# Patient Record
Sex: FEMALE | Race: WHITE | NOT HISPANIC OR LATINO | URBAN - METROPOLITAN AREA
[De-identification: names, ages, dates, MRNs, and addresses within clinical notes are randomized per-mention and may not be internally consistent; named-entity substitution may affect disease eponyms.]

---

## 2021-01-25 ENCOUNTER — GYNECOLOGY VISIT (OUTPATIENT)
Dept: OBGYN | Facility: CLINIC | Age: 36
End: 2021-01-25
Payer: COMMERCIAL

## 2021-01-25 VITALS — DIASTOLIC BLOOD PRESSURE: 76 MMHG | SYSTOLIC BLOOD PRESSURE: 111 MMHG | WEIGHT: 141 LBS

## 2021-01-25 DIAGNOSIS — Z30.433 ENCOUNTER FOR IUD REMOVAL AND REINSERTION: ICD-10-CM

## 2021-01-25 DIAGNOSIS — Z97.5 IUD (INTRAUTERINE DEVICE) IN PLACE: ICD-10-CM

## 2021-01-25 DIAGNOSIS — Z30.09 ENCOUNTER FOR COUNSELING REGARDING CONTRACEPTION: ICD-10-CM

## 2021-01-25 PROCEDURE — 58300 INSERT INTRAUTERINE DEVICE: CPT | Performed by: OBSTETRICS & GYNECOLOGY

## 2021-01-25 PROCEDURE — 58301 REMOVE INTRAUTERINE DEVICE: CPT | Performed by: OBSTETRICS & GYNECOLOGY

## 2021-01-25 NOTE — PROCEDURES
S: Patient presents for IUD removal. Mirena IUD has been in place for 5.5 years    O: VSS, afebrile  Gen - NAD, comfortable  PELVIC EXAM -   Normal external female genitalia  BUS within normal limits, no lesions  Spec: cervix has no lesions, normal physiologic white discharge, IUD strings visualized from cervix  Vaginal wall: pink and moist, normal rugae, no lesions    Procedure - IUD removal:  Pt counseled on IUD removal. Risk for cramping, bleeding discussed.  Consent form signed.  Speculum placed in the vagina and cervix visualized. IUD strings not  Seen. Serpintine cervix was prepped with Betadine.  IUD grasper was introduced into the endocervical canal and IUD strings were found.  IUD strings grasped with ring forceps and removed intact. Hemostasis noted. Pt tolerated procedure well.    A: IUD removal    P:  Plan is for replacement with new Mirena IUD today

## 2021-01-25 NOTE — PROGRESS NOTES
Subjective:      Alena Narayan is a 35 y.o. female who presents as New Patient            HPI patient is a 35-year-old  0 who presents today as a new patient to establish care and to discuss contraception.  She recently moved here from [facility].  She has no gynecologic complaints or concerns.  Last Pap smear was  and was normal per patient.  She reports no history of PID or STDs.  Reports no prior abnormal Pap smears.    Patient states she has a Mirena IUD in place for the past 5-1/2 years and she tried several times before moving to this area to have the IUD removed but could not get an appointment.  She has no complaints with IUD.  She has mostly amenorrhea since IUD placement with some occasional spotting.  Denies any pelvic pain abnormal discharge or dyspareunia.  Reports normal bowel and bladder functions.    ROS all organ systems are reviewed and are currently negative       Objective:     /76 (BP Location: Right arm, Patient Position: Sitting, BP Cuff Size: Adult)   Wt 64 kg (141 lb)      Physical Exam  Vitals signs and nursing note reviewed. Exam conducted with a chaperone present.   Constitutional:       General: She is not in acute distress.     Appearance: Normal appearance. She is normal weight. She is not toxic-appearing.   HENT:      Head: Normocephalic and atraumatic.      Nose: Nose normal.   Neck:      Musculoskeletal: Normal range of motion and neck supple. No neck rigidity.   Cardiovascular:      Rate and Rhythm: Normal rate and regular rhythm.      Pulses: Normal pulses.      Heart sounds: Normal heart sounds. No murmur. No gallop.    Pulmonary:      Effort: Pulmonary effort is normal. No respiratory distress.      Breath sounds: Normal breath sounds. No wheezing.   Abdominal:      General: Abdomen is flat. Bowel sounds are normal. There is no distension.      Palpations: Abdomen is soft.      Tenderness: There is no abdominal tenderness.   Genitourinary:     General:  Normal vulva.      Vagina: No vaginal discharge.      Rectum: Normal.   Musculoskeletal: Normal range of motion.      Right lower leg: No edema.      Left lower leg: No edema.   Lymphadenopathy:      Cervical: No cervical adenopathy.   Skin:     General: Skin is warm and dry.      Coloration: Skin is not jaundiced.      Findings: No bruising or lesion.   Neurological:      General: No focal deficit present.      Mental Status: She is alert and oriented to person, place, and time.   Psychiatric:         Mood and Affect: Mood normal.         Behavior: Behavior normal.         Thought Content: Thought content normal.         Judgment: Judgment normal.          Discussion:    Discussed with patient today various forms of birth control available. We reviewed birth control pills, Depo-provera, NuvaRing, IUDs (both hormonal and non-hormonal), and Nexplanon.  Risks, benefits, and side effects to each method discussed in detail.  Also discussed with patient the barrier methods (condoms) available for contraception and prevention of STDs.  After discussion of all the available methods, patient elects for Mirena IUD removal and replacement.  Requested procedures to be done today.  Patient counseled on IUD extensively and agrees to usage.  States she has not palpated IUD strings but strings were cut very short at the time of placement.       Assessment/Plan:        1. Encounter for counseling regarding contraception  Patient presents today to establish gynecologic care and to discuss contraception.  She currently has Mirena IUD    2. IUD (intrauterine device) in place  IUD is in place per patient IUD strings were not visualized.  We discussed that we will have to use serpentine grasper to possibly locate IUD and patient agrees  - POCT Pregnancy  - Consent for all Surgical, Special Diagnostic or Therapeutic Procedures    3. Encounter for IUD removal and reinsertion  Patient desires Mirena IUD removal and replacement and was  counseled on IUD and agrees to usage  - levonorgestrel (Mirena) 52 mg (20 mcg/24 hr) IUD 1 Each    4.  Mirena IUD removed and replaced today.  Patient tolerated procedure well.  Safe sex and backup contraception discussed.  Patient to follow-up in 4 to 6 weeks for IUD surveillance.

## 2021-01-25 NOTE — NON-PROVIDER
Pt here to discuss IUD removal and replacement  Pt states has had mirena for 5.5  Good#[phone]  Pharmacy verified

## 2021-01-25 NOTE — PROCEDURES
35 y.o.    Female presents here today for her Mirena IUD insertion:     No LMP recorded.      Today the patient is counseled on the risks of IUD insertion. I also discussed with the patient the risk of infection on insertion, and had asked the patient to remain on pelvic rest for one week following the insertion. We also discussed the risk of IUD expulsion, the risk of uterine perforation and IUD migration. If the IUD does migrate the patient may require a separate procedure such as a laparoscopy to retrieve the migrated IUD. I also discussed the 1% risk of pregnancy with IUD use. Also discussed with patient today increased risk of ectopic pregnancy with IUD use. Also discussed today the possibility that IUD may need to be removed secondary to bleeding profile or pain. Also discussed were the possibility that partner can feel the IUD during intercourse. I also discussed the side effects of Mirena which can be amenorrhea or dysfunctional uterine bleeding or spotting.  Patient had the opportunity to ask questions regarding insertion, risks and benefits, all questions are answered in their entirety.  Informed consent is signed.    Procedure note  Urine pregnancy test is negative, informed consent was previously signed  The bimanual exam is performed the uterus is noted to be 7 weeks in size and is mid position  A speculum was inserted into the vagina, the cervix was cleansed with Betadine swabs x3  Tenaculum was placed on the anterior lip of the cervix  The uterus was sounded to a depth of 7 centimeters  The IUD is placed under sterile conditions, without difficulty  The strings trimmed to approximately 3 cm  Tenaculum was removed from the cervix and hemostasis was achieved with silver nitrate  The patient tolerated the procedure well    Patient is asked to followup in 4 to 6 weeks for IUD check. The patient is asked to remain on pelvic rest for one week. She is asked to return to office sooner as needed for  heavy vaginal bleeding, uncontrolled pain, fever, or any other concerns.

## 2021-03-29 ENCOUNTER — HOSPITAL ENCOUNTER (OUTPATIENT)
Facility: MEDICAL CENTER | Age: 36
End: 2021-03-29
Attending: NURSE PRACTITIONER
Payer: COMMERCIAL

## 2021-03-29 PROCEDURE — U0003 INFECTIOUS AGENT DETECTION BY NUCLEIC ACID (DNA OR RNA); SEVERE ACUTE RESPIRATORY SYNDROME CORONAVIRUS 2 (SARS-COV-2) (CORONAVIRUS DISEASE [COVID-19]), AMPLIFIED PROBE TECHNIQUE, MAKING USE OF HIGH THROUGHPUT TECHNOLOGIES AS DESCRIBED BY CMS-2020-01-R: HCPCS

## 2021-03-29 PROCEDURE — U0005 INFEC AGEN DETEC AMPLI PROBE: HCPCS

## 2021-03-30 LAB
COVID ORDER STATUS COVID19: NORMAL
SARS-COV-2 RNA RESP QL NAA+PROBE: NOTDETECTED
SPECIMEN SOURCE: NORMAL